# Patient Record
(demographics unavailable — no encounter records)

---

## 2024-11-15 NOTE — ASSESSMENT
[FreeTextEntry1] : Patient with right lower extremity edema.  No evidence of significant arterial insufficiency.  We had made the recommendation for right inguinal hernia repair.  The patient got a second opinion and decided to hold off.  Currently patient has decided to proceed with the hernia repair.  Venous duplex in the office shows no evidence of DVT.  Patient has been off anticoagulation.  Patient is cleared from a vascular surgery standpoint to undergo hernia repair.

## 2024-11-15 NOTE — PHYSICAL EXAM
[JVD] : no jugular venous distention  [Normal Breath Sounds] : Normal breath sounds [Normal Heart Sounds] : normal heart sounds [2+] : left 2+ [Ankle Swelling (On Exam)] : present [Ankle Swelling On The Right] : of the right ankle [Ankle Swelling On The Left] : moderate [Abdomen Masses] : No abdominal masses

## 2024-11-15 NOTE — HISTORY OF PRESENT ILLNESS
[FreeTextEntry1] : Patient is a 71-year-old female with past medical history significant for ovarian carcinoma status post bilateral oophorectomy and total abdominal hysterectomy 6 months ago.  Patient did not receive any chemotherapy or postoperative radiation.  Patient also has a history of right lower extremity DVT which was treated with 6 months of Coumadin about 5 years ago now presenting to us with 10-day history of right lower extremity swelling and discomfort.  No chest pain or shortness of breath.  No fevers or chills.\par  \par  No recent history of travel.

## 2024-12-06 NOTE — HISTORY OF PRESENT ILLNESS
[de-identified] : Karime is a 72 y/o female being seen for consultation, hernia  CT Abdomen 6/5/23 ( Swelling of right lower extremity. Endometrial cancer) - No iliocaval thrombus is identified. However, there is marked narrowing/compression of the right CFV and saphenofemoral junction secondary to moderate-sized fat-containing lesion of the right inguinal region measuring 5.7 x 3.1 x 9.9 cm. Lipoma versus fat containing right femoral hernia are considerations. Possible narrow neck on coronal image 51 series 4. Apparent wall thickening versus underdistention of the proximal right colon redemonstrated, for which neoplasm cannot be excluded. Recommend colonoscopy for further evaluation. Probable simple and proteinaceous renal cysts as described above. Recommend renal ultrasound to confirm benignity.     Today pt has some discomfort of bilateral groin area and umbilicus which comes and goes. Has had this swelling any pain since February of this year. Does sometimes have nausea but no vomiting. Normal BMs. Good appetite. Taking baby aspirin, hx of multiple DVTs.

## 2024-12-06 NOTE — PHYSICAL EXAM
[Normal Breath Sounds] : Normal breath sounds [Normal Heart Sounds] : normal heart sounds [Alert] : alert [Oriented to Person] : oriented to person [Oriented to Place] : oriented to place [Oriented to Time] : oriented to time [Calm] : calm [de-identified] : WNL  [de-identified] : WNL [de-identified] : NAUNL [de-identified] : Obese, soft, nontender.  Multiple well-healed laparoscopic incisions without palpable incisional or umbilical hernia.  No palpable left inguinal hernia.  No palpable right inguinal hernia.  There is a soft minimally tender mass of the right medial thigh below the inguinal canal which does not reduce or change in size with recumbency coughing or straining [de-identified] : WNL ROM [de-identified] : WNL

## 2024-12-06 NOTE — CONSULT LETTER
[Dear  ___] : Dear  [unfilled], [Consult Letter:] : I had the pleasure of evaluating your patient, [unfilled]. [Please see my note below.] : Please see my note below. [Consult Closing:] : Thank you very much for allowing me to participate in the care of this patient.  If you have any questions, please do not hesitate to contact me. [Sincerely,] : Sincerely, [FreeTextEntry3] : Ham Gallagher M.D., F.A.C.S, F.A.S.C.R.S [DrMayuri  ___] : Dr. DOBBINS [DrMayuri ___] : Dr. DOBBINS

## 2024-12-06 NOTE — HISTORY OF PRESENT ILLNESS
[de-identified] : Karime is a 72 y/o female being seen for consultation, hernia  CT Abdomen 6/5/23 ( Swelling of right lower extremity. Endometrial cancer) - No iliocaval thrombus is identified. However, there is marked narrowing/compression of the right CFV and saphenofemoral junction secondary to moderate-sized fat-containing lesion of the right inguinal region measuring 5.7 x 3.1 x 9.9 cm. Lipoma versus fat containing right femoral hernia are considerations. Possible narrow neck on coronal image 51 series 4. Apparent wall thickening versus underdistention of the proximal right colon redemonstrated, for which neoplasm cannot be excluded. Recommend colonoscopy for further evaluation. Probable simple and proteinaceous renal cysts as described above. Recommend renal ultrasound to confirm benignity.     Today pt has some discomfort of bilateral groin area and umbilicus which comes and goes. Has had this swelling any pain since February of this year. Does sometimes have nausea but no vomiting. Normal BMs. Good appetite. Taking baby aspirin, hx of multiple DVTs.

## 2024-12-06 NOTE — ASSESSMENT
[FreeTextEntry1] : In summary the patient is a pleasant 73-year-old woman who was originally seen by my partner Dr. Jordan earlier this year for a mass in the right groin.  This was felt to represent either a femoral hernia versus lipoma.  She subsequently developed DVT which has since resolved.  She is presently off anticoagulation.  She complains of discomfort and a mass in her right groin.  On examination she has a mildly tender nonreducible soft mass on the right medial thigh below the inguinal ligament.  I reviewed her CT from last year with Dr. Irving Gongora who feels that this is likely either a lipoma or possibly liposarcoma and not a right femoral hernia.  I ordered an MRI of the right thigh/pelvis for further evaluation and referred the patient to surgical oncology for further evaluation and possible surgery.

## 2024-12-06 NOTE — PHYSICAL EXAM
[Normal Breath Sounds] : Normal breath sounds [Normal Heart Sounds] : normal heart sounds [Alert] : alert [Oriented to Person] : oriented to person [Oriented to Place] : oriented to place [Oriented to Time] : oriented to time [Calm] : calm [de-identified] : WNL  [de-identified] : WNL [de-identified] : NAUNL [de-identified] : Obese, soft, nontender.  Multiple well-healed laparoscopic incisions without palpable incisional or umbilical hernia.  No palpable left inguinal hernia.  No palpable right inguinal hernia.  There is a soft minimally tender mass of the right medial thigh below the inguinal canal which does not reduce or change in size with recumbency coughing or straining [de-identified] : WNL ROM [de-identified] : WNL

## 2025-01-06 NOTE — CONSULT LETTER
[Dear  ___] : Dear ~AIXA, [Consult Letter:] : I had the pleasure of evaluating your patient, [unfilled]. [Please see my note below.] : Please see my note below. [Consult Closing:] : Thank you very much for allowing me to participate in the care of this patient.  If you have any questions, please do not hesitate to contact me. [Sincerely,] : Sincerely, [DrMayuri  ___] : Dr. DOBBINS [FreeTextEntry2] : Ham Gallagher MD [FreeTextEntry3] : Julio César Bryant MD Surgical Oncology [facility] Office: [phone] Cell: [phone]  [DrMayuri ___] : Dr. DOBBINS

## 2025-01-06 NOTE — HISTORY OF PRESENT ILLNESS
[de-identified] : Ms. KARIME TIM is a 73-year-old woman, referred by Dr. Ham Gallagher for consultation regarding a right inguinal mass, here for an initial visit.   Karime's history is significant for stage 1A endometrial carcinoma s/p RUTHANN-BSO 9/2022 w/ Dr. Cece Kingsley, she received no adjuvant therapy and remained on close surveillance. She has a history of multiple DVTs and was treated w/ AC - most recently completed a course of Eliquis 12/2024.  She presented to Dr. Gallagher's office w/ complaints of a right groin mass - initially thought to be an inguinal hernia. Dr. 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 As such, she was referred to my office for further work-up.    PMH/PSH: endometrial cancer s/p RUTHANN-BSO 9/2022 w/ Dr. Becker (w/ no adjuvant chemo or RT), angelique & appendectomy, arthritis, asthma, HTN, HLD, TMJ, DVT - completed Eliquis course x 6 m. Meds:  off Eliquis 12/2024.  On ASA. ALL:  NKA SH:  denies tobacco or ETOH use FH:   MR femur & pelvis 1/2/2025 - right inguinal fat-containing hernia measuring up to 6.1 x 3.2 cm & appears to arise medial to the inferior epigastric vessels c/w direct inguinal hernia  - the inguinal hernia abuts & appears to have mild mass effect on adjacent LN, there is a suspected conglomerate of LN vs bulky LAD measuring 3.4 x 8.8 cm --> may be reactive in etiology, f/u imaging in 6 months to assess stability    1/6/2025 - Karime is here for an initial consultation for the right inguinal mass.

## 2025-01-06 NOTE — PHYSICAL EXAM
[Normal] : supple, no neck mass and thyroid not enlarged [Normal Neck Lymph Nodes] : normal neck lymph nodes  [Normal Supraclavicular Lymph Nodes] : normal supraclavicular lymph nodes [Normal Groin Lymph Nodes] : normal groin lymph nodes [Normal Axillary Lymph Nodes] : normal axillary lymph nodes [Normal] : oriented to person, place and time, with appropriate affect [de-identified] : Palpable right upper thigh/groin mass approximately 6 cm in size.  Does not change with position or coughing.

## 2025-01-06 NOTE — ASSESSMENT
[FreeTextEntry1] :  Karime and I discussed the plan for a combined procedure with Dr. Gallagher to resect the lipomatous mass and repair of the inguinal/femoral hernia.  We discussed the risks, benefits and alternatives of the procedure.  We also discussed post operative expectations and possible complications.  She expresses understanding and agrees to proceed.  All medical entries were at my, Dr. Julio César Bryant, direction. I have reviewed the chart and agree that the record accurately reflects my personal performance of the history, physical exam, assessment, and plan.  Our office nurse practitioner was present for the duration of the office visit.

## 2025-01-29 NOTE — PHYSICAL EXAM
[Chaperone Present] : A chaperone was present in the examining room during all aspects of the physical examination [Abnormal] : Bladder: Abnormal [Absent] : Adnexa(ae): Absent [Normal] : Recto-Vaginal Exam: Normal [Fully active, able to carry on all pre-disease performance without restriction] : Status 0 - Fully active, able to carry on all pre-disease performance without restriction

## 2025-02-21 NOTE — ASSESSMENT
[FreeTextEntry1] : In summary the patient is status post right femoral hernia repair with mesh.  Pathology was benign.  Her postoperative course was complicated by right lower extremity DVT requiring anticoagulation with Eliquis.  She is ambulating at home but complains of persistent discomfort in her right lower extremity.  There is moderate edema of the right lower extremity with palpable pedal pulses.  She will continue with leg elevation and compression wrap.  I referred her for physical therapy.  She will be following up with Dr. Bryant and Dr. Ricardo next week as well.  I will see her in 3 weeks for checkup

## 2025-02-21 NOTE — HISTORY OF PRESENT ILLNESS
[de-identified] : GOPAL is a 73 year old female here for s/p Right femoral hernia repair with mesh (medium Bard PerFix plug). 02/06/2025  Pathology- Right femoral region, herniorrhaphy: -   Unremarkable adipose tissue.  PMH/PSH: endometrial cancer s/p RUTHANN-BSO 9/2022 w/ Dr. Becker (w/ no adjuvant chemo or RT), angelique & appendectomy, arthritis, asthma, HTN, HLD, TMJ, DVT - completed Eliquis course x 6 m. Meds: off Eliquis 12/2024. On ASA. s/p right femoral vein and artery exploration without repair on 2/5/25 by Dr. Silvano Ricardo  Today patient c/o mild surgical incisional pain.  BMs regular once daily.  Good appetite.  Denies fever or chills.

## 2025-02-21 NOTE — HISTORY OF PRESENT ILLNESS
[de-identified] : GOPAL is a 73 year old female here for s/p Right femoral hernia repair with mesh (medium Bard PerFix plug). 02/06/2025  Pathology- Right femoral region, herniorrhaphy: -   Unremarkable adipose tissue.  PMH/PSH: endometrial cancer s/p RUTHANN-BSO 9/2022 w/ Dr. Becker (w/ no adjuvant chemo or RT), angelique & appendectomy, arthritis, asthma, HTN, HLD, TMJ, DVT - completed Eliquis course x 6 m. Meds: off Eliquis 12/2024. On ASA. s/p right femoral vein and artery exploration without repair on 2/5/25 by Dr. Silvano Ricardo  Today patient c/o mild surgical incisional pain.  BMs regular once daily.  Good appetite.  Denies fever or chills.

## 2025-02-21 NOTE — PHYSICAL EXAM
[de-identified] : Right groin incision healed without evidence of infection or hernia right lower extremity is edematous and swollen.  The right lower extremity is warm with palpable DP and PT pulses

## 2025-02-21 NOTE — PHYSICAL EXAM
[de-identified] : Right groin incision healed without evidence of infection or hernia right lower extremity is edematous and swollen.  The right lower extremity is warm with palpable DP and PT pulses

## 2025-02-25 NOTE — ASSESSMENT
[FreeTextEntry1] : We discussed Karime's pathology.  I will pathology if MDM2 testing is necessary.  We also discussed the left lower extremity edema.  Karime will continue follow-up with Dr. Ricardo from vascular surgery later today.  She is remaining on Eliquis.  We discussed compression and elevation of the leg and considering PM&R lymphedema therapy.  She will follow-up with me in 2 months.   All medical entries were at my, Dr. Julio César Bryant, direction. I have reviewed the chart and agree that the record accurately reflects my personal performance of the history, physical exam, assessment, and plan.  Our office nurse practitioner was present for the duration of the office visit.

## 2025-02-25 NOTE — CONSULT LETTER
[Dear  ___] : Dear ~AIXA, [Consult Letter:] : I had the pleasure of evaluating your patient, [unfilled]. [Please see my note below.] : Please see my note below. [Consult Closing:] : Thank you very much for allowing me to participate in the care of this patient.  If you have any questions, please do not hesitate to contact me. [Sincerely,] : Sincerely, [DrMayuri  ___] : Dr. DOBBINS [DrMayuri ___] : Dr. DOBBINS [FreeTextEntry2] : Ham Gallagher MD [FreeTextEntry3] : Julio César Bryant MD Surgical Oncology [facility] Office: [phone] Cell: [phone]

## 2025-02-25 NOTE — HISTORY OF PRESENT ILLNESS
[de-identified] : Ms. KARIME TIM is a 73-year-old woman, referred by Dr. Ham Gallagher for consultation regarding a right inguinal mass, now s/p resection of Right groin mass on 2/5/2025, here for a post-op visit.   Karime's history is significant for stage 1A endometrial carcinoma s/p RUTHANN-BSO 9/2022 w/ Dr. Cece Kingsley, she received no adjuvant therapy and remained on close surveillance. She has a history of multiple DVTs and was treated w/ AC - most recently completed a course of Eliquis 12/2024.  She presented to Dr. Gallagher's office w/ complaints of a right groin mass - initially thought to be an inguinal hernia. Dr. 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 As such, she was referred to my office for further work-up.    PMH/PSH: endometrial cancer s/p RUTHANN-BSO 9/2022 w/ Dr. Becker (w/ no adjuvant chemo or RT), angelique & appendectomy, arthritis, asthma, HTN, HLD, TMJ, DVT - completed Eliquis course x 6 m. Meds:  off Eliquis 12/2024.  On ASA. ALL:  NKA SH:  denies tobacco or ETOH use FH:   MR femur & pelvis 1/2/2025 - right inguinal fat-containing hernia measuring up to 6.1 x 3.2 cm & appears to arise medial to the inferior epigastric vessels c/w direct inguinal hernia  - the inguinal hernia abuts & appears to have mild mass effect on adjacent LN, there is a suspected conglomerate of LN vs bulky LAD measuring 3.4 x 8.8 cm --> may be reactive in etiology, f/u imaging in 6 months to assess stability    1/6/2025 - Karime is here for an initial consultation for the right inguinal mass. Karime and I discussed the plan for a combined procedure with Dr. Gallagher to resect the lipomatous mass and repair of the inguinal/femoral hernia.  We discussed the risks, benefits and alternatives of the procedure.  We also discussed post operative expectations and possible complications.  She expresses understanding and agrees to proceed.  **SURGERY** Karime is s/p a right groin mass resection (w/ concomitant Right inguinal hernia repair w/ Dr. Gallagher) on 2/5/2025, path: - hernia w/ unremarkable adipose tissue  (intra-op consult w/ Dr. Ricardo s/p right femoral vein & artery exploration w/o repair)  *post-op course c/b RLE DVT, restarted Eliquis, RLE ACE wrapped & DC home   2/25/2025 - Karime is here for an initial post-op visit, she has seen Dr. Gallagher in the interim and will be seeing Dr. Ricardo later today. Karime is pleased with her final pathology. She notes some persistent RLE discomfort and moderate edema - pedal pulses are palpable, and she continues to elevate & ACE wrap the leg.

## 2025-02-25 NOTE — PHYSICAL EXAM
[Normal] : supple, no neck mass and thyroid not enlarged [Normal Neck Lymph Nodes] : normal neck lymph nodes  [Normal Supraclavicular Lymph Nodes] : normal supraclavicular lymph nodes [Normal Groin Lymph Nodes] : normal groin lymph nodes [Normal Axillary Lymph Nodes] : normal axillary lymph nodes [Normal] : oriented to person, place and time, with appropriate affect [de-identified] : right groin incision healing well, notable edema to RLE w/ palpable pedal pulses

## 2025-02-25 NOTE — PHYSICAL EXAM
[Normal] : supple, no neck mass and thyroid not enlarged [Normal Neck Lymph Nodes] : normal neck lymph nodes  [Normal Supraclavicular Lymph Nodes] : normal supraclavicular lymph nodes [Normal Groin Lymph Nodes] : normal groin lymph nodes [Normal Axillary Lymph Nodes] : normal axillary lymph nodes [Normal] : oriented to person, place and time, with appropriate affect [de-identified] : right groin incision healing well, notable edema to RLE w/ palpable pedal pulses

## 2025-02-25 NOTE — CONSULT LETTER
[Dear  ___] : Dear ~AIXA, [Consult Letter:] : I had the pleasure of evaluating your patient, [unfilled]. [Please see my note below.] : Please see my note below. [Consult Closing:] : Thank you very much for allowing me to participate in the care of this patient.  If you have any questions, please do not hesitate to contact me. [Sincerely,] : Sincerely, [DrMayuri  ___] : Dr. DOBBINS [DrMayuri ___] : Dr. DOBBINS [FreeTextEntry2] : Ham Gallagher MD [FreeTextEntry3] : Julio César Bryant MD Surgical Oncology [facility] Office: [phone] Cell: [phone]

## 2025-02-25 NOTE — HISTORY OF PRESENT ILLNESS
[de-identified] : Ms. KARIME TIM is a 73-year-old woman, referred by Dr. Ham Gallagher for consultation regarding a right inguinal mass, now s/p resection of Right groin mass on 2/5/2025, here for a post-op visit.   Karime's history is significant for stage 1A endometrial carcinoma s/p RUTHANN-BSO 9/2022 w/ Dr. Cece Kingsley, she received no adjuvant therapy and remained on close surveillance. She has a history of multiple DVTs and was treated w/ AC - most recently completed a course of Eliquis 12/2024.  She presented to Dr. Gallagher's office w/ complaints of a right groin mass - initially thought to be an inguinal hernia. Dr. 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 As such, she was referred to my office for further work-up.    PMH/PSH: endometrial cancer s/p RUTHANN-BSO 9/2022 w/ Dr. Becker (w/ no adjuvant chemo or RT), angelique & appendectomy, arthritis, asthma, HTN, HLD, TMJ, DVT - completed Eliquis course x 6 m. Meds:  off Eliquis 12/2024.  On ASA. ALL:  NKA SH:  denies tobacco or ETOH use FH:   MR femur & pelvis 1/2/2025 - right inguinal fat-containing hernia measuring up to 6.1 x 3.2 cm & appears to arise medial to the inferior epigastric vessels c/w direct inguinal hernia  - the inguinal hernia abuts & appears to have mild mass effect on adjacent LN, there is a suspected conglomerate of LN vs bulky LAD measuring 3.4 x 8.8 cm --> may be reactive in etiology, f/u imaging in 6 months to assess stability    1/6/2025 - Karime is here for an initial consultation for the right inguinal mass. Karime and I discussed the plan for a combined procedure with Dr. Gallagher to resect the lipomatous mass and repair of the inguinal/femoral hernia.  We discussed the risks, benefits and alternatives of the procedure.  We also discussed post operative expectations and possible complications.  She expresses understanding and agrees to proceed.  **SURGERY** Karime is s/p a right groin mass resection (w/ concomitant Right inguinal hernia repair w/ Dr. Gallagher) on 2/5/2025, path: - hernia w/ unremarkable adipose tissue  (intra-op consult w/ Dr. Ricardo s/p right femoral vein & artery exploration w/o repair)  *post-op course c/b RLE DVT, restarted Eliquis, RLE ACE wrapped & DC home   2/25/2025 - Karime is here for an initial post-op visit, she has seen Dr. Gallagher in the interim and will be seeing Dr. Ricardo later today. Karime is pleased with her final pathology. She notes some persistent RLE discomfort and moderate edema - pedal pulses are palpable, and she continues to elevate & ACE wrap the leg.

## 2025-02-28 NOTE — REASON FOR VISIT
[de-identified] : Right groin exploration [de-identified] : Patient is status post right groin exploration.  For explanation please read my operative note.  Patient is complaining of some right leg swelling which is more than usual.  She is currently on anticoagulation.  In the office today she underwent a duplex which does not show evidence of a DVT at this time.  Right now I would continue with leg elevation and wrapping with an Ace bandage.  Ultimately, patient probably will need lymphedema therapy but I would prefer to wait until our next visit to reevaluate.

## 2025-03-11 NOTE — PHYSICAL EXAM
[de-identified] : Right thigh soft and nontender.  Right groin incision healed.  Right calf still mildly swollen/edematous.  No sign of cellulitis.  Palpable pedal pulses.  Right foot warm

## 2025-03-11 NOTE — HISTORY OF PRESENT ILLNESS
[de-identified] : GOPAL is a 73 year old female here for s/p Right femoral hernia repair with mesh (medium Bard PerFix plug). 02/06/2025 Pathology- Right femoral region, herniorrhaphy: - Unremarkable adipose tissue.  PMH/PSH: endometrial cancer s/p RUTHANN-BSO 9/2022 w/ Dr. Becker (w/ no adjuvant chemo or RT), angelique & appendectomy, arthritis, asthma, HTN, HLD, TMJ, DVT - completed Eliquis course x 6 m. Meds: off Eliquis 12/2024. On ASA. s/p right femoral vein and artery exploration without repair on 2/5/25 by Dr. Silvano Ricardo.  Pt was last seen 2/21/25- In summary the patient is status post right femoral hernia repair with mesh. Pathology was benign. Her postoperative course was complicated by right lower extremity DVT requiring anticoagulation with Eliquis. She is ambulating at home but complains of persistent discomfort in her right lower extremity. There is moderate edema of the right lower extremity with palpable pedal pulses. She will continue with leg elevation and compression wrap. I referred her for physical therapy. She will be following up with Dr. Bryant and Dr. Ricardo next week as well. I will see her in 3 weeks for checkup.   Today patient reports improvement of Right thigh pain and swelling, but lower leg still painful and swollen, takes Tylenol, continue to use of Ace bandage wrapping and leg elevation daily.  Surgical incision site with no pain.  Daily BMs regular.  Patient is on Eliquis and baby aspirin.

## 2025-03-11 NOTE — PHYSICAL EXAM
[de-identified] : Right thigh soft and nontender.  Right groin incision healed.  Right calf still mildly swollen/edematous.  No sign of cellulitis.  Palpable pedal pulses.  Right foot warm

## 2025-03-11 NOTE — HISTORY OF PRESENT ILLNESS
[de-identified] : GOPAL is a 73 year old female here for s/p Right femoral hernia repair with mesh (medium Bard PerFix plug). 02/06/2025 Pathology- Right femoral region, herniorrhaphy: - Unremarkable adipose tissue.  PMH/PSH: endometrial cancer s/p RUTHANN-BSO 9/2022 w/ Dr. Becker (w/ no adjuvant chemo or RT), angelique & appendectomy, arthritis, asthma, HTN, HLD, TMJ, DVT - completed Eliquis course x 6 m. Meds: off Eliquis 12/2024. On ASA. s/p right femoral vein and artery exploration without repair on 2/5/25 by Dr. Silvano Ricardo.  Pt was last seen 2/21/25- In summary the patient is status post right femoral hernia repair with mesh. Pathology was benign. Her postoperative course was complicated by right lower extremity DVT requiring anticoagulation with Eliquis. She is ambulating at home but complains of persistent discomfort in her right lower extremity. There is moderate edema of the right lower extremity with palpable pedal pulses. She will continue with leg elevation and compression wrap. I referred her for physical therapy. She will be following up with Dr. Bryant and Dr. Ricardo next week as well. I will see her in 3 weeks for checkup.   Today patient reports improvement of Right thigh pain and swelling, but lower leg still painful and swollen, takes Tylenol, continue to use of Ace bandage wrapping and leg elevation daily.  Surgical incision site with no pain.  Daily BMs regular.  Patient is on Eliquis and baby aspirin.

## 2025-03-11 NOTE — ASSESSMENT
[FreeTextEntry1] : In summary the patient is making steady progress following right femoral hernia repair and excision of right thigh mass.  Her right lower extremity edema is slowly improving.  She is ambulating better.  She is still having some discomfort especially in her foot and calf.  I recommended trying gabapentin.  She continues to keep her right lower extremity elevated and wrapped with an Ace wrap at night.  She had vascular ultrasound 2 weeks ago which showed no DVT.  She remains on Eliquis.  I will see her again in 3 weeks for checkup.  She will also follow-up with vascular surgery to determine whether or not there is a role for lymphedema therapy in the future.

## 2025-05-01 NOTE — REASON FOR VISIT
[de-identified] : Right groin exploration [de-identified] : Patient is status post right groin exploration.  For explanation please read my operative note.  Patient is complaining of some right leg swelling which is more than usual.  She is currently on anticoagulation.  In the office today she underwent a duplex which does not show evidence of a DVT at this time.  Right now I would continue with leg elevation and wrapping with an Ace bandage.  The swelling in my opinion is clearly improved.  Patient still with symptoms of pain in the right leg.  I reassured her that this will take time.  She will follow-up in several months with a repeat duplex study.  In the interim, she should continue on anti-coagulation.